# Patient Record
Sex: MALE | Race: WHITE | NOT HISPANIC OR LATINO | Employment: FULL TIME | URBAN - NONMETROPOLITAN AREA
[De-identification: names, ages, dates, MRNs, and addresses within clinical notes are randomized per-mention and may not be internally consistent; named-entity substitution may affect disease eponyms.]

---

## 2018-12-03 ENCOUNTER — OFFICE VISIT (OUTPATIENT)
Dept: ORTHOPEDIC SURGERY | Facility: CLINIC | Age: 28
End: 2018-12-03

## 2018-12-03 VITALS — HEIGHT: 73 IN | RESPIRATION RATE: 18 BRPM | WEIGHT: 200 LBS | BODY MASS INDEX: 26.51 KG/M2

## 2018-12-03 DIAGNOSIS — S82.65XA CLOSED NONDISPLACED FRACTURE OF LATERAL MALLEOLUS OF LEFT FIBULA, INITIAL ENCOUNTER: Primary | ICD-10-CM

## 2018-12-03 PROCEDURE — 99203 OFFICE O/P NEW LOW 30 MIN: CPT | Performed by: PODIATRIST

## 2018-12-03 PROCEDURE — 29515 APPLICATION SHORT LEG SPLINT: CPT | Performed by: PODIATRIST

## 2018-12-03 NOTE — PROGRESS NOTES
Subjective   Patient ID: Albino Griffin is a 28 y.o. male   Fracture of the Left Ankle (Stepped off tail bed of truck and rolled ankle on 11/30/18. Went to [facility] ER, dx'd with ankle fx. )  Comes in today stating that over the weekend he was sitting on a friend's tailgate and jumped off and rolled his ankle.  States he walked into the emergency department and initially did not think this was fractured area was sent here today with x-rays along with a posterior splint and stirrup.  He is actively and gainfully employed and is questioning his work status.    History of Present Illness                                                   Review of Systems   Constitutional: Negative for fever.   HENT: Negative for voice change.    Eyes: Negative for visual disturbance.   Respiratory: Negative for shortness of breath.    Cardiovascular: Negative for chest pain.   Gastrointestinal: Negative for abdominal distention and abdominal pain.   Genitourinary: Negative for dysuria.   Musculoskeletal: Positive for arthralgias, gait problem and joint swelling.   Skin: Negative for rash.   Neurological: Negative for speech difficulty.   Hematological: Does not bruise/bleed easily.   Psychiatric/Behavioral: Negative for confusion.       No past medical history on file.     Past Surgical History:   Procedure Laterality Date   • ANKLE OPEN REDUCTION INTERNAL FIXATION Right 2008    3 screws       Allergies not on file    No current outpatient medications on file.    History reviewed. No pertinent family history.    Social History     Socioeconomic History   • Marital status:      Spouse name: Not on file   • Number of children: Not on file   • Years of education: Not on file   • Highest education level: Not on file   Social Needs   • Financial resource strain: Not on file   • Food insecurity - worry: Not on file   • Food insecurity - inability: Not on file   • Transportation needs - medical: Not on file   • Transportation needs -  non-medical: Not on file   Occupational History   • Not on file   Tobacco Use   • Smoking status: Never Smoker   • Smokeless tobacco: Never Used   Substance and Sexual Activity   • Alcohol use: Yes     Frequency: Monthly or less   • Drug use: Not on file   • Sexual activity: Not on file   Other Topics Concern   • Not on file   Social History Narrative   • Not on file       Counseling given: Not Answered       I have reviewed all of the above social hx, family hx, surgical hx, medications, allergies & ROS and confirm that it is accurate.  Objective   Physical Exam   Constitutional: He is oriented to person, place, and time. He appears well-developed and well-nourished.   HENT:   Head: Normocephalic and atraumatic.   Nose: Nose normal.   Eyes: Conjunctivae and EOM are normal. Pupils are equal, round, and reactive to light.   Neck: Normal range of motion.   Cardiovascular: Normal rate.   Pulmonary/Chest: Effort normal.   Abdominal: Soft.   Neurological: He is alert and oriented to person, place, and time.   Skin: Skin is warm.   Psychiatric: He has a normal mood and affect. His behavior is normal. Judgment and thought content normal.   Nursing note and vitals reviewed.    Ortho Exam left  Lower extremity exam:  Vascular: Pulses palpable, pedal hair noted, CFT brisk, no edema noted  Neuro: Gross sensation intact  Derm: Normal turgor and temperature, no wounds or sores or lesions  MSK: Tender to palpation of the left ankle.  Tender to the left fibula.  Decreased range of motion due to pain.      Assessment/Plan left distal fibula fracture.  Independent Review of Radiographic Studies:    Three-view x-rays from outside facility of the left ankle.  No comparison films available.  X-rays show a nondisplaced spiral oriented fracture of the left distal fibula.  No increased medial clear space.  No widening of the syndesmosis.  Laboratory and Other Studies:     Medical Decision Making:        Splint Application  Date/Time:  12/3/2018 3:56 PM  Performed by: Reinaldo Pettit DPM  Authorized by: Reinaldo Pettit DPM   Consent: Verbal consent obtained. Written consent obtained.  Consent given by: patient  Patient understanding: patient states understanding of the procedure being performed  Patient consent: the patient's understanding of the procedure matches consent given  Procedure consent: procedure consent matches procedure scheduled  Relevant documents: relevant documents present and verified  Test results: test results available and properly labeled  Site marked: the operative site was marked  Imaging studies: imaging studies available  Patient identity confirmed: verbally with patient  Splint type: short leg  Supplies used: cotton padding,  elastic bandage and Ortho-Glass  Post-procedure: The splinted body part was neurovascularly unchanged following the procedure.  Patient tolerance: Patient tolerated the procedure well with no immediate complications        [] No procedures were performed in office today.    There are no diagnoses linked to this encounter.      Recommendations/Plan:  I reviewed his x-rays and discussed his fracture with him.  I explained if his fracture will remain in its current place no surgery will be needed.  He is placed in a splint to allow for continued swelling.  Follow-up in about 10 days for repeat x-rays and if no changes on x-rays will plan to cast him.  He'll be kept off work until follow-up foot I explained he'll most likely be off work for a minimum of 6 weeks and at the 6 week todd we'll begin transition weightbearing with boot.  Rest, ice, the last compression and elevation for swelling.  Anti-inflammatories as needed.    No Follow-up on file.  Patient agreeable to call or return sooner for any concerns.

## 2018-12-07 ENCOUNTER — DOCUMENTATION (OUTPATIENT)
Dept: ORTHOPEDIC SURGERY | Facility: CLINIC | Age: 28
End: 2018-12-07

## 2018-12-12 DIAGNOSIS — S82.65XA CLOSED NONDISPLACED FRACTURE OF LATERAL MALLEOLUS OF LEFT FIBULA, INITIAL ENCOUNTER: Primary | ICD-10-CM

## 2018-12-13 ENCOUNTER — OFFICE VISIT (OUTPATIENT)
Dept: ORTHOPEDIC SURGERY | Facility: CLINIC | Age: 28
End: 2018-12-13

## 2018-12-13 VITALS — BODY MASS INDEX: 26.51 KG/M2 | RESPIRATION RATE: 18 BRPM | HEIGHT: 73 IN | WEIGHT: 200 LBS

## 2018-12-13 DIAGNOSIS — S82.65XA CLOSED NONDISPLACED FRACTURE OF LATERAL MALLEOLUS OF LEFT FIBULA, INITIAL ENCOUNTER: Primary | ICD-10-CM

## 2018-12-13 PROCEDURE — 99213 OFFICE O/P EST LOW 20 MIN: CPT | Performed by: PODIATRIST

## 2018-12-13 NOTE — PROGRESS NOTES
Subjective   Patient ID: Albino Griffin is a 28 y.o. male   Follow-up of the Left Ankle (left distal fibula fracture, DOI: 11/30/18)    Comes back in today for follow-up of his left ankle fracture.  He is utilizing his crutches.  Splint is intact.  History of Present Illness                                                   Review of Systems   Constitutional: Negative.    Respiratory: Negative.    Musculoskeletal: Positive for arthralgias (right ankle, foot).   Skin: Negative.        No past medical history on file.     Past Surgical History:   Procedure Laterality Date   • ANKLE OPEN REDUCTION INTERNAL FIXATION Right 2008    3 screws       No Known Allergies    No current outpatient medications on file.    History reviewed. No pertinent family history.    Social History     Socioeconomic History   • Marital status:      Spouse name: Not on file   • Number of children: Not on file   • Years of education: Not on file   • Highest education level: Not on file   Social Needs   • Financial resource strain: Not on file   • Food insecurity - worry: Not on file   • Food insecurity - inability: Not on file   • Transportation needs - medical: Not on file   • Transportation needs - non-medical: Not on file   Occupational History   • Not on file   Tobacco Use   • Smoking status: Never Smoker   • Smokeless tobacco: Never Used   Substance and Sexual Activity   • Alcohol use: Yes     Frequency: Monthly or less   • Drug use: No   • Sexual activity: Defer   Other Topics Concern   • Not on file   Social History Narrative   • Not on file       Counseling given: No       I have reviewed all of the above social hx, family hx, surgical hx, medications, allergies & ROS and confirm that it is accurate.  Objective   Physical Exam   Constitutional: He is oriented to person, place, and time. He appears well-developed and well-nourished.   HENT:   Head: Normocephalic and atraumatic.   Nose: Nose normal.   Eyes: Conjunctivae and EOM are  normal. Pupils are equal, round, and reactive to light.   Neck: Normal range of motion.   Cardiovascular: Normal rate.   Pulmonary/Chest: Effort normal.   Neurological: He is alert and oriented to person, place, and time.   Skin: Skin is warm.   Psychiatric: He has a normal mood and affect. His behavior is normal. Judgment and thought content normal.   Nursing note and vitals reviewed.    Ortho Exam  Ortho Exam left  Lower extremity exam:  Vascular: Pulses palpable, pedal hair noted, CFT brisk, no edema noted  Neuro: Gross sensation intact  Derm: Normal turgor and temperature, no wounds or sores or lesions  MSK: Tender to palpation of the left ankle.  Tender to the left fibula.  Decreased range of motion due to pain.  But range of motion and pain have improved since last visit.  Assessment/Plan left distal fibula fracture nondisplaced  Independent Review of Radiographic Studies:      Laboratory and Other Studies:     Medical Decision Making:        Procedures  [] No procedures were performed in office today.    There are no diagnoses linked to this encounter.      Recommendations/Plan:  I reviewed his x-rays with him and discussed his compliance with boot versus cast.  He states he will be compliant with the boot.  I warned him of noncompliance resulting in need for surgery and displacement of the fracture.  Continue to rest, ice, he lost compression, elevation and anti-inflammatories as needed.  Follow-up in 2-3 weeks for x-rays and consider weightbearing transition.    No Follow-up on file.  Patient agreeable to call or return sooner for any concerns.

## 2019-01-03 ENCOUNTER — OFFICE VISIT (OUTPATIENT)
Dept: ORTHOPEDIC SURGERY | Facility: CLINIC | Age: 29
End: 2019-01-03

## 2019-01-03 VITALS — WEIGHT: 200 LBS | RESPIRATION RATE: 18 BRPM | BODY MASS INDEX: 26.51 KG/M2 | HEIGHT: 73 IN

## 2019-01-03 DIAGNOSIS — S82.65XA CLOSED NONDISPLACED FRACTURE OF LATERAL MALLEOLUS OF LEFT FIBULA, INITIAL ENCOUNTER: Primary | ICD-10-CM

## 2019-01-03 PROCEDURE — 99213 OFFICE O/P EST LOW 20 MIN: CPT | Performed by: PODIATRIST

## 2019-01-03 NOTE — PROGRESS NOTES
Subjective   Patient ID: Albino Griffin is a 28 y.o. male   Follow-up of the Left Ankle (Lateral malleolus fracture DOI: 11/30/18)  Turns today for his left ankle fracture.  Wearing his boot.  He states that the first night he get home when he was here last he went home and was sleep walking without his boot.  This concerned him but he states it hasn't bothered him.    History of Present Illness                                                   Review of Systems   Constitutional: Negative.    Respiratory: Negative.    Cardiovascular: Negative.    Musculoskeletal: Positive for arthralgias (left ankle, leg).   Skin: Negative.        History reviewed. No pertinent past medical history.     Past Surgical History:   Procedure Laterality Date   • ANKLE OPEN REDUCTION INTERNAL FIXATION Right 2008    3 screws       No Known Allergies    No current outpatient medications on file.    History reviewed. No pertinent family history.    Social History     Socioeconomic History   • Marital status:      Spouse name: Not on file   • Number of children: Not on file   • Years of education: Not on file   • Highest education level: Not on file   Social Needs   • Financial resource strain: Not on file   • Food insecurity - worry: Not on file   • Food insecurity - inability: Not on file   • Transportation needs - medical: Not on file   • Transportation needs - non-medical: Not on file   Occupational History   • Not on file   Tobacco Use   • Smoking status: Never Smoker   • Smokeless tobacco: Never Used   Substance and Sexual Activity   • Alcohol use: Yes     Frequency: Monthly or less   • Drug use: No   • Sexual activity: Defer   Other Topics Concern   • Not on file   Social History Narrative   • Not on file       Counseling given: Not Answered       I have reviewed all of the above social hx, family hx, surgical hx, medications, allergies & ROS and confirm that it is accurate.  Objective   Physical Exam   Constitutional: He is  oriented to person, place, and time. He appears well-developed and well-nourished.   HENT:   Head: Normocephalic and atraumatic.   Nose: Nose normal.   Eyes: Conjunctivae and EOM are normal. Pupils are equal, round, and reactive to light.   Neck: Normal range of motion.   Cardiovascular: Normal rate.   Pulmonary/Chest: Effort normal.   Neurological: He is alert and oriented to person, place, and time.   Skin: Skin is warm.   Psychiatric: He has a normal mood and affect. His behavior is normal. Judgment and thought content normal.   Nursing note and vitals reviewed.    Ortho Exam  Lower extremity exam:  Vascular: Pulses palpable, pedal hair noted, CFT brisk, no edema noted  Neuro: Gross sensation intact  Derm: Normal turgor and temperature, no wounds or sores or lesions  MSK: Joint range of motion normal, manual muscle testing normal, no pain to palpation, no pain with range of motion      Assessment/Plan healing left distal fibula fracture  Independent Review of Radiographic Studies:      Laboratory and Other Studies:     Medical Decision Making:        Procedures  [] No procedures were performed in office today.    Albino was seen today for follow-up.    Diagnoses and all orders for this visit:    Closed nondisplaced fracture of lateral malleolus of left fibula, initial encounter  -     XR Ankle 3+ View Left          Recommendations/Plan:  He's given a lace up ankle brace today and we'll begin transition out of the boot to lace up ankle brace and regular shoe.  Continue rice as needed.  He may return to work at his scheduled date on January 13.  Follow-up 1 month for final x-rays and hopeful final exam.    Return in about 4 weeks (around 1/31/2019) for xoa.  Patient agreeable to call or return sooner for any concerns.

## 2019-01-28 DIAGNOSIS — S82.65XA CLOSED NONDISPLACED FRACTURE OF LATERAL MALLEOLUS OF LEFT FIBULA, INITIAL ENCOUNTER: Primary | ICD-10-CM

## 2019-01-29 ENCOUNTER — OFFICE VISIT (OUTPATIENT)
Dept: ORTHOPEDIC SURGERY | Facility: CLINIC | Age: 29
End: 2019-01-29

## 2019-01-29 VITALS — BODY MASS INDEX: 26.51 KG/M2 | HEIGHT: 73 IN | WEIGHT: 200 LBS | RESPIRATION RATE: 18 BRPM

## 2019-01-29 DIAGNOSIS — S82.65XA CLOSED NONDISPLACED FRACTURE OF LATERAL MALLEOLUS OF LEFT FIBULA, INITIAL ENCOUNTER: Primary | ICD-10-CM

## 2019-01-29 PROCEDURE — 99213 OFFICE O/P EST LOW 20 MIN: CPT | Performed by: PODIATRIST

## 2019-01-29 NOTE — PROGRESS NOTES
Subjective   Patient ID: Albino Griffin is a 28 y.o. male   Follow-up of the Left Ankle (Lateral malleolus fracture, DOI: 11/30/18)  Returns today for his left ankle fracture.  Says it gets sore at times when he is been up on it for work all day but he is doing well.  He has questions about his right ankle popping all the time as well.    History of Present Illness                                                   Review of Systems   Constitutional: Negative.    Respiratory: Negative.    Gastrointestinal: Negative.    Musculoskeletal: Positive for arthralgias (occasional left ankle).   Skin: Negative.        History reviewed. No pertinent past medical history.     Past Surgical History:   Procedure Laterality Date   • ANKLE OPEN REDUCTION INTERNAL FIXATION Right 2008    3 screws       No Known Allergies    No current outpatient medications on file.    History reviewed. No pertinent family history.    Social History     Socioeconomic History   • Marital status:      Spouse name: Not on file   • Number of children: Not on file   • Years of education: Not on file   • Highest education level: Not on file   Social Needs   • Financial resource strain: Not on file   • Food insecurity - worry: Not on file   • Food insecurity - inability: Not on file   • Transportation needs - medical: Not on file   • Transportation needs - non-medical: Not on file   Occupational History   • Not on file   Tobacco Use   • Smoking status: Never Smoker   • Smokeless tobacco: Never Used   Substance and Sexual Activity   • Alcohol use: Yes     Frequency: Monthly or less   • Drug use: No   • Sexual activity: Defer   Other Topics Concern   • Not on file   Social History Narrative   • Not on file       Counseling given: No       I have reviewed all of the above social hx, family hx, surgical hx, medications, allergies & ROS and confirm that it is accurate.  Objective   Physical Exam   Constitutional: He is oriented to person, place, and time.  He appears well-developed and well-nourished.   HENT:   Head: Normocephalic and atraumatic.   Nose: Nose normal.   Eyes: Conjunctivae and EOM are normal. Pupils are equal, round, and reactive to light.   Neck: Normal range of motion.   Cardiovascular: Normal rate.   Pulmonary/Chest: Effort normal.   Neurological: He is alert and oriented to person, place, and time.   Skin: Skin is warm.   Psychiatric: He has a normal mood and affect. His behavior is normal. Judgment and thought content normal.   Nursing note and vitals reviewed.    Ortho Examleft  Lower extremity exam:  Vascular: Pulses palpable, pedal hair noted, CFT brisk, no edema noted  Neuro: Gross sensation intact  Derm: Normal turgor and temperature, no wounds or sores or lesions  MSK: Joint range of motion normal, manual muscle testing normal, minimal pain to palpation over the fracture., no pain with range of motion  With the range of motion of the right ankle there is audible popping noted but no pain.    Assessment/Plan left distal fibula fracture  Independent Review of Radiographic Studies:      Laboratory and Other Studies:     Medical Decision Making:        Procedures  [] No procedures were performed in office today.    Albino was seen today for follow-up.    Diagnoses and all orders for this visit:    Closed nondisplaced fracture of lateral malleolus of left fibula, initial encounter          Recommendations/Plan:  I advised him that some occasional pain and soreness with prolonged activity or weightbearing on the left side will be normal for the next few weeks.  If 2-3 months from now I still having pain or problems I would like to see this and look for other occult injuries.  Regards to the right side I recommend we monitor this but explained typically as long as the popping does not hurt we do not worry about this.  Follow-up when necessary.    Return if symptoms worsen or fail to improve.  Patient agreeable to call or return sooner for any  concerns.